# Patient Record
Sex: MALE | Race: AMERICAN INDIAN OR ALASKA NATIVE
[De-identification: names, ages, dates, MRNs, and addresses within clinical notes are randomized per-mention and may not be internally consistent; named-entity substitution may affect disease eponyms.]

---

## 2018-06-09 ENCOUNTER — HOSPITAL ENCOUNTER (EMERGENCY)
Dept: HOSPITAL 5 - ED | Age: 28
Discharge: HOME | End: 2018-06-09
Payer: SELF-PAY

## 2018-06-09 VITALS — SYSTOLIC BLOOD PRESSURE: 128 MMHG | DIASTOLIC BLOOD PRESSURE: 76 MMHG

## 2018-06-09 DIAGNOSIS — L03.011: Primary | ICD-10-CM

## 2018-06-09 PROCEDURE — 99282 EMERGENCY DEPT VISIT SF MDM: CPT

## 2018-06-09 NOTE — EMERGENCY DEPARTMENT REPORT
ED Upper Extremity Inj HPI





- General


Chief Complaint: Pain General


Stated Complaint: RIGHT HAND SWOLLEN/NUMB


Time Seen by Provider: 06/09/18 14:54


Source: patient


Mode of arrival: Ambulatory


Limitations: No Limitations





- History of Present Illness


Initial Comments: 





Mr. Pfeiffer 29 yo male presents with infection of distal right middle finger.  

He often bites his finger nails.  Pain is minimal  No drainage


MD Complaint: Injury to:: right, finger


-: Gradual, days(s) (3)


Other Extremity Injury: Fingers: Right (middle finger)





- Related Data


 Previous Rx's











 Medication  Instructions  Recorded  Last Taken  Type


 


Sulfamethoxazole/Trimethoprim 1 each PO BID #20 tablet 06/09/18 Unknown Rx





[Bactrim DS TAB]    











 Allergies











Allergy/AdvReac Type Severity Reaction Status Date / Time


 


No Known Allergies Allergy   Unverified 06/09/18 12:58














ED Review of Systems


ROS: 


Stated complaint: RIGHT HAND SWOLLEN/NUMB


Other details as noted in HPI








ED Past Medical Hx





- Past Medical History


Previous Medical History?: No





- Surgical History


Past Surgical History?: No





- Social History


Smoking Status: Never Smoker


Substance Use Type: None





- Medications


Home Medications: 


 Home Medications











 Medication  Instructions  Recorded  Confirmed  Last Taken  Type


 


Sulfamethoxazole/Trimethoprim 1 each PO BID #20 tablet 06/09/18  Unknown Rx





[Bactrim DS TAB]     














ED Physical Exam





- General


Limitations: No Limitations


General appearance: alert, in no apparent distress





- Psychiatric


Psychiatric exam: Present: normal affect, normal mood





- Skin


Skin exam: Present: intact, erythema





- Other


Other exam information: 





distal erythema to middle right finger, fluctuant purulence noted at nailbed





ED Course





 Vital Signs











  06/09/18





  12:52


 


Temperature 98.4 F


 


Pulse Rate 101 H


 


Blood Pressure 135/70


 


O2 Sat by Pulse 96





Oximetry 














- I & D


  ** Right Finger


Type of Procedure: Simple


Blade Size: 11


Progress: 





alcohol prep, small 8 mm incision, 1 ml of pus expressed, bandage provided by 

nursing staff





ED Medical Decision Making





- Medical Decision Making





paronychia of right middle finger, I&D successful, rx: bactrim


Critical care attestation.: 


If time is entered above; I have spent that time in minutes in the direct care 

of this critically ill patient, excluding procedure time.








ED Disposition


Clinical Impression: 


 Paronychia





Disposition: DC-01 TO HOME OR SELFCARE


Is pt being admited?: No


Does the pt Need Aspirin: No


Condition: Stable


Instructions:  Paronychia (ED)


Prescriptions: 


Sulfamethoxazole/Trimethoprim [Bactrim DS TAB] 1 each PO BID #20 tablet


Time of Disposition: 15:09